# Patient Record
Sex: FEMALE | Race: WHITE | Employment: PART TIME | URBAN - METROPOLITAN AREA
[De-identification: names, ages, dates, MRNs, and addresses within clinical notes are randomized per-mention and may not be internally consistent; named-entity substitution may affect disease eponyms.]

---

## 2022-11-01 LAB — MAMMOGRAPHY, EXTERNAL: NORMAL

## 2023-04-20 ENCOUNTER — OFFICE VISIT (OUTPATIENT)
Dept: INTERNAL MEDICINE CLINIC | Age: 48
End: 2023-04-20
Payer: COMMERCIAL

## 2023-04-20 VITALS
OXYGEN SATURATION: 98 % | WEIGHT: 174 LBS | SYSTOLIC BLOOD PRESSURE: 130 MMHG | TEMPERATURE: 98 F | HEART RATE: 82 BPM | DIASTOLIC BLOOD PRESSURE: 88 MMHG

## 2023-04-20 DIAGNOSIS — R23.2 HOT FLASHES: ICD-10-CM

## 2023-04-20 DIAGNOSIS — Q21.0 VSD (VENTRICULAR SEPTAL DEFECT): ICD-10-CM

## 2023-04-20 DIAGNOSIS — R10.32 LLQ ABDOMINAL PAIN: ICD-10-CM

## 2023-04-20 PROCEDURE — 99203 OFFICE O/P NEW LOW 30 MIN: CPT | Performed by: STUDENT IN AN ORGANIZED HEALTH CARE EDUCATION/TRAINING PROGRAM

## 2023-04-20 RX ORDER — PROGESTERONE 200 MG/1
200 CAPSULE ORAL DAILY
COMMUNITY

## 2023-04-20 RX ORDER — TESTOSTERONE ENANTHATE 200 MG/ML
50 INJECTION, SOLUTION INTRAMUSCULAR ONCE
COMMUNITY

## 2023-04-20 SDOH — ECONOMIC STABILITY: FOOD INSECURITY: WITHIN THE PAST 12 MONTHS, YOU WORRIED THAT YOUR FOOD WOULD RUN OUT BEFORE YOU GOT MONEY TO BUY MORE.: NEVER TRUE

## 2023-04-20 SDOH — ECONOMIC STABILITY: FOOD INSECURITY: WITHIN THE PAST 12 MONTHS, THE FOOD YOU BOUGHT JUST DIDN'T LAST AND YOU DIDN'T HAVE MONEY TO GET MORE.: NEVER TRUE

## 2023-04-20 SDOH — ECONOMIC STABILITY: INCOME INSECURITY: HOW HARD IS IT FOR YOU TO PAY FOR THE VERY BASICS LIKE FOOD, HOUSING, MEDICAL CARE, AND HEATING?: NOT HARD AT ALL

## 2023-04-20 SDOH — ECONOMIC STABILITY: HOUSING INSECURITY
IN THE LAST 12 MONTHS, WAS THERE A TIME WHEN YOU DID NOT HAVE A STEADY PLACE TO SLEEP OR SLEPT IN A SHELTER (INCLUDING NOW)?: NO

## 2023-04-20 ASSESSMENT — ENCOUNTER SYMPTOMS
TROUBLE SWALLOWING: 0
CHEST TIGHTNESS: 0
VOICE CHANGE: 0
ABDOMINAL DISTENTION: 0
PHOTOPHOBIA: 0
STRIDOR: 0
CONSTIPATION: 0
SHORTNESS OF BREATH: 0
WHEEZING: 0
DIARRHEA: 0
COUGH: 0
ABDOMINAL PAIN: 0
NAUSEA: 0
CHOKING: 0
APNEA: 0
VOMITING: 0

## 2023-04-20 ASSESSMENT — PATIENT HEALTH QUESTIONNAIRE - PHQ9
SUM OF ALL RESPONSES TO PHQ9 QUESTIONS 1 & 2: 0
SUM OF ALL RESPONSES TO PHQ QUESTIONS 1-9: 0
1. LITTLE INTEREST OR PLEASURE IN DOING THINGS: 0
SUM OF ALL RESPONSES TO PHQ QUESTIONS 1-9: 0
2. FEELING DOWN, DEPRESSED OR HOPELESS: 0

## 2023-04-20 NOTE — PROGRESS NOTES
effort is normal. No respiratory distress. Breath sounds: Normal breath sounds. No wheezing or rales. Chest:      Chest wall: No tenderness. Abdominal:      General: Bowel sounds are normal. There is no distension. Palpations: Abdomen is soft. Musculoskeletal:         General: No tenderness or deformity. Normal range of motion. Cervical back: Normal range of motion and neck supple. Skin:     General: Skin is warm and dry. Neurological:      Mental Status: She is alert and oriented to person, place, and time. Cranial Nerves: No cranial nerve deficit. Sensory: No sensory deficit. Psychiatric:         Behavior: Behavior normal.            Please note that this chart was generated using dragon dictation software. Although every effort was made to ensure the accuracy of this automated transcription, some errors in transcription may have occurred.        --Monica Lorenzo MD

## 2023-05-03 ENCOUNTER — OFFICE VISIT (OUTPATIENT)
Dept: INTERNAL MEDICINE CLINIC | Age: 48
End: 2023-05-03
Payer: COMMERCIAL

## 2023-05-03 VITALS
DIASTOLIC BLOOD PRESSURE: 90 MMHG | BODY MASS INDEX: 30.56 KG/M2 | WEIGHT: 179 LBS | TEMPERATURE: 98.8 F | HEIGHT: 64 IN | SYSTOLIC BLOOD PRESSURE: 144 MMHG

## 2023-05-03 DIAGNOSIS — I10 PRIMARY HYPERTENSION: Primary | ICD-10-CM

## 2023-05-03 PROCEDURE — 3080F DIAST BP >= 90 MM HG: CPT | Performed by: STUDENT IN AN ORGANIZED HEALTH CARE EDUCATION/TRAINING PROGRAM

## 2023-05-03 PROCEDURE — 3077F SYST BP >= 140 MM HG: CPT | Performed by: STUDENT IN AN ORGANIZED HEALTH CARE EDUCATION/TRAINING PROGRAM

## 2023-05-03 PROCEDURE — 99213 OFFICE O/P EST LOW 20 MIN: CPT | Performed by: STUDENT IN AN ORGANIZED HEALTH CARE EDUCATION/TRAINING PROGRAM

## 2023-05-03 RX ORDER — VALSARTAN AND HYDROCHLOROTHIAZIDE 80; 12.5 MG/1; MG/1
1 TABLET, FILM COATED ORAL DAILY
Qty: 30 TABLET | Refills: 3 | Status: SHIPPED | OUTPATIENT
Start: 2023-05-03 | End: 2023-06-02

## 2023-05-03 RX ORDER — VALSARTAN AND HYDROCHLOROTHIAZIDE 80; 12.5 MG/1; MG/1
1 TABLET, FILM COATED ORAL DAILY
Qty: 30 TABLET | Refills: 3 | Status: SHIPPED | OUTPATIENT
Start: 2023-05-03 | End: 2023-05-03

## 2023-05-03 ASSESSMENT — ENCOUNTER SYMPTOMS
SHORTNESS OF BREATH: 0
VOMITING: 0
VOICE CHANGE: 0
STRIDOR: 0
DIARRHEA: 0
CONSTIPATION: 0
PHOTOPHOBIA: 0
ABDOMINAL DISTENTION: 0
WHEEZING: 0
ABDOMINAL PAIN: 0
CHOKING: 0
CHEST TIGHTNESS: 0
COUGH: 0
NAUSEA: 0
TROUBLE SWALLOWING: 0
APNEA: 0

## 2023-05-03 NOTE — PROGRESS NOTES
Chiquita Ding (:  1975) is a 46 y.o. female,Established patient, here for evaluation of the following chief complaint(s):  Hypertension (B/p  issues dizzy spells 168 /109 yesterday )         ASSESSMENT/PLAN:  1. Primary hypertension  Assessment & Plan:  Was previously treated with losartan and metoprolol. Did not tolerate lisinopril secondary to headaches. Reports some possible mood swing while on losartan. BP at home elevated 160s-170s/100s-110s  -Start Diovan HCTZ 80 - 12.5 mg daily  -We will have patient continue to monitor her blood pressure at home  -Check BMP and mag in a week      Orders:  -     Basic Metabolic Panel; Future  -     Magnesium; Future      Return in about 4 weeks (around 2023) for Hypertension. Subjective   SUBJECTIVE/OBJECTIVE:  HPI  The patient presented today for hypertension. She reports that for the past week, she endorses intermittent dizzy spells. During these episode, her blood pressure elevated to 160s to 170s over 100 to 110s. She has a history of hypertension, was on 2 medications at 1 point. She see cardiologist at [facility] and was previously on losartan and metoprolol. Slowly weaned off from them after obtaining good blood pressure control. Did not tolerate lisinopril secondary to headaches. She report that she had \"mood swings \"while on losartan. She was not able to have an appointment to see her cardiologist until September. Denies any chest pain, shortness of breath, headache, dizziness today. Review of Systems   Constitutional:  Negative for activity change, appetite change, chills, diaphoresis, fatigue, fever and unexpected weight change. HENT:  Negative for trouble swallowing and voice change. Eyes:  Negative for photophobia and visual disturbance. Respiratory:  Negative for apnea, cough, choking, chest tightness, shortness of breath, wheezing and stridor. Cardiovascular:  Negative for chest pain, palpitations and leg swelling.

## 2023-05-03 NOTE — ASSESSMENT & PLAN NOTE
Was previously treated with losartan and metoprolol. Did not tolerate lisinopril secondary to headaches. Reports some possible mood swing while on losartan.   BP at home elevated 160s-170s/100s-110s  -Start Diovan HCTZ 80 - 12.5 mg daily  -We will have patient continue to monitor her blood pressure at home  -Check BMP and mag in a week

## 2023-05-11 DIAGNOSIS — I10 PRIMARY HYPERTENSION: ICD-10-CM

## 2023-05-11 LAB
ANION GAP SERPL CALCULATED.3IONS-SCNC: 12 MMOL/L (ref 3–16)
BUN SERPL-MCNC: 16 MG/DL (ref 7–20)
CALCIUM SERPL-MCNC: 9.6 MG/DL (ref 8.3–10.6)
CHLORIDE SERPL-SCNC: 99 MMOL/L (ref 99–110)
CO2 SERPL-SCNC: 27 MMOL/L (ref 21–32)
CREAT SERPL-MCNC: 0.7 MG/DL (ref 0.6–1.1)
GFR SERPLBLD CREATININE-BSD FMLA CKD-EPI: >60 ML/MIN/{1.73_M2}
GLUCOSE SERPL-MCNC: 105 MG/DL (ref 70–99)
MAGNESIUM SERPL-MCNC: 2.1 MG/DL (ref 1.8–2.4)
POTASSIUM SERPL-SCNC: 4.4 MMOL/L (ref 3.5–5.1)
SODIUM SERPL-SCNC: 138 MMOL/L (ref 136–145)

## 2023-05-20 ENCOUNTER — TELEPHONE (OUTPATIENT)
Dept: INTERNAL MEDICINE CLINIC | Age: 48
End: 2023-05-20

## 2023-05-22 RX ORDER — VALSARTAN AND HYDROCHLOROTHIAZIDE 160; 12.5 MG/1; MG/1
1 TABLET, FILM COATED ORAL DAILY
Qty: 30 TABLET | Refills: 5 | Status: SHIPPED | OUTPATIENT
Start: 2023-05-22

## 2023-05-22 NOTE — TELEPHONE ENCOUNTER
BP seems to be improved but not at our goal yet. Will increase Valsartan to 160 and keep HCTZ at 12.5 in the new combo pills. Rx sent.

## 2023-05-31 ENCOUNTER — OFFICE VISIT (OUTPATIENT)
Dept: INTERNAL MEDICINE CLINIC | Age: 48
End: 2023-05-31
Payer: COMMERCIAL

## 2023-05-31 VITALS
DIASTOLIC BLOOD PRESSURE: 64 MMHG | WEIGHT: 177 LBS | BODY MASS INDEX: 30.22 KG/M2 | HEIGHT: 64 IN | TEMPERATURE: 98.8 F | SYSTOLIC BLOOD PRESSURE: 120 MMHG

## 2023-05-31 DIAGNOSIS — N20.0 KIDNEY STONE: ICD-10-CM

## 2023-05-31 DIAGNOSIS — Z01.818 PREOP EXAMINATION: Primary | ICD-10-CM

## 2023-05-31 PROCEDURE — 3074F SYST BP LT 130 MM HG: CPT | Performed by: STUDENT IN AN ORGANIZED HEALTH CARE EDUCATION/TRAINING PROGRAM

## 2023-05-31 PROCEDURE — 3078F DIAST BP <80 MM HG: CPT | Performed by: STUDENT IN AN ORGANIZED HEALTH CARE EDUCATION/TRAINING PROGRAM

## 2023-05-31 PROCEDURE — 99213 OFFICE O/P EST LOW 20 MIN: CPT | Performed by: STUDENT IN AN ORGANIZED HEALTH CARE EDUCATION/TRAINING PROGRAM

## 2023-05-31 ASSESSMENT — ENCOUNTER SYMPTOMS
TROUBLE SWALLOWING: 0
CHOKING: 0
SHORTNESS OF BREATH: 0
APNEA: 0
STRIDOR: 0
CONSTIPATION: 0
CHEST TIGHTNESS: 0
ABDOMINAL DISTENTION: 0
WHEEZING: 0
VOICE CHANGE: 0
VOMITING: 0
ABDOMINAL PAIN: 0
NAUSEA: 0
DIARRHEA: 0
COUGH: 0
PHOTOPHOBIA: 0

## 2023-05-31 NOTE — ASSESSMENT & PLAN NOTE
Large kidney stone 13 mm at the renal pelvis associated with moderate left hydronephrosis evident on CT abdomen and pelvis on 5/15/2023  Patient was evaluated by urology, plan for cystoscopy, stone removal and left stent placement on 6/1/2023. No urinary symptoms.   Patient agreed with procedure

## 2023-05-31 NOTE — PROGRESS NOTES
Pre-Op Examination    :  Radha Fried                                               : 1975  Age: 46 y.o. MRN: [id number]  Date : 2023    Referring Physician: Dr. Kareen Farley     Procedure: Cystoscopy, L ureteroscopy Sarabjit Peters laser stone manipulation, L stent insertion on 2023    HISTORY OF PRESENT ILLNESS:   The patient is a 46 y.o. female who presents for preoperative examination. She denies any urinary symptoms, however endorses nausea. Had CT A/P on 5/15/2023 that showed moderate left hydronephrosis and 13 mm stone in the renal pelvis. Was evaluated by Uro and plans for cysto, stone removal, and L stent placement. At baseline, METS more than 4. Of note, she is on hormonal replacement therapy including progesterone and testosterone. Planned anesthesia:  General  Known anesthesia problems: None   Bleeding risk:  Low   Personal or FH of DVT/PE:  None       Past Medical History:    Hypertension  Postmenopausal syndrome  VSD    Past Surgical History:    History reviewed. No pertinent surgical history. Family History:       Problem Relation Age of Onset    Hypertension Mother     Diabetes Father     Heart Attack Father         Pasted away from MI at 61       Social History:   TOBACCO:   reports that she has never smoked. She has never been exposed to tobacco smoke. She has never used smokeless tobacco.  ETOH:   reports no history of alcohol use. OCCUPATION:      Allergies:  Patient has no known allergies. Current Medications:    Prior to Admission medications    Medication Sig Start Date End Date Taking? Authorizing Provider   valsartan-hydroCHLOROthiazide (DIOVAN-HCT) 160-12.5 MG per tablet Take 1 tablet by mouth daily 23  Yes Luis Blue MD   progesterone (PROMETRIUM) 200 MG CAPS capsule Take 1 capsule by mouth daily   Yes Historical Provider, MD   testosterone enanthate (DELATESTRYL) 200 MG/ML injection Inject 0.25 mLs into the muscle once.    Yes Historical Provider, MD

## 2023-05-31 NOTE — ASSESSMENT & PLAN NOTE
No contraindications, patient can proceed with planned procedure with acceptable risk.   Also had cardiac clearance from her cardiologist including recent EKG

## 2023-06-30 PROBLEM — Z01.818 PREOP EXAMINATION: Status: RESOLVED | Noted: 2023-05-31 | Resolved: 2023-06-30

## 2023-10-20 ENCOUNTER — OFFICE VISIT (OUTPATIENT)
Dept: INTERNAL MEDICINE CLINIC | Age: 48
End: 2023-10-20
Payer: COMMERCIAL

## 2023-10-20 VITALS
HEIGHT: 64 IN | SYSTOLIC BLOOD PRESSURE: 134 MMHG | BODY MASS INDEX: 30.39 KG/M2 | DIASTOLIC BLOOD PRESSURE: 70 MMHG | WEIGHT: 178 LBS

## 2023-10-20 DIAGNOSIS — I10 PRIMARY HYPERTENSION: Primary | ICD-10-CM

## 2023-10-20 DIAGNOSIS — E78.2 MIXED HYPERLIPIDEMIA: ICD-10-CM

## 2023-10-20 PROCEDURE — 3078F DIAST BP <80 MM HG: CPT | Performed by: STUDENT IN AN ORGANIZED HEALTH CARE EDUCATION/TRAINING PROGRAM

## 2023-10-20 PROCEDURE — 3075F SYST BP GE 130 - 139MM HG: CPT | Performed by: STUDENT IN AN ORGANIZED HEALTH CARE EDUCATION/TRAINING PROGRAM

## 2023-10-20 PROCEDURE — 99213 OFFICE O/P EST LOW 20 MIN: CPT | Performed by: STUDENT IN AN ORGANIZED HEALTH CARE EDUCATION/TRAINING PROGRAM

## 2023-10-20 NOTE — PROGRESS NOTES
Rashmi Fierro (:  1975) is a 46 y.o. female here for evaluation of the following chief complaint(s):  Hypertension         ASSESSMENT/PLAN:  1. Primary hypertension  Assessment & Plan:   Well-controlled. She also see cardiology every 6 months for the VSD. Continue with current medications. Orders:  -     Hepatic Function Panel; Future  -     Lipid, Fasting; Future  2. Mixed hyperlipidemia  -     Lipid, Fasting; Future      Return in about 1 year (around 10/20/2024) for Annual Physical Exam.     We also discussed about vaccines today. I recommended flu vaccine and COVID-19 booster. Patient would like to hold off. She is going to get shingles vaccines at her local pharmacy. Subjective   SUBJECTIVE/OBJECTIVE:  LIZZY Benson presented today for follow-up visit. She has been doing well. No acute complaints today. She is compliant with her medications. She has concern about a finding on her CAT scan in May 2023 which was done to evaluate her kidney stone. There was a finding of some subcentimeter but most likely benign lesion in the liver. CAT scan reviewed with patient. Reassurance provided. we will check labs  No regular use of Tylenol, alcohol use, or history of hepatitis. .  Of note she is on progesterone and testosterone for hot flashes. She is also going to get a repeat echocardiogram to evaluate for VSD with cardiology. No chest pain, palpitation, shortness of breath.     Review of Systems:   Constitutional:  Denies fever or chills   Eyes:  Denies change in visual acuity   HENT:  Denies nasal congestion or sore throat   Respiratory:  Denies cough or shortness of breath   Cardiovascular:  Denies chest pain or edema   GI:  Denies abdominal pain, nausea, vomiting, bloody stools or diarrhea   :  Denies dysuria   Musculoskeletal:  Denies back pain or joint pain   Integument:  Denies rash   Neurologic:  Denies headache, focal weakness or sensory changes   Endocrine:  Denies polyuria or polydipsia

## 2023-10-20 NOTE — ASSESSMENT & PLAN NOTE
Well-controlled. She also see cardiology every 6 months for the VSD. Continue with current medications.

## 2023-11-10 DIAGNOSIS — E78.2 MIXED HYPERLIPIDEMIA: ICD-10-CM

## 2023-11-10 DIAGNOSIS — I10 PRIMARY HYPERTENSION: ICD-10-CM

## 2023-11-10 LAB
ALBUMIN SERPL-MCNC: 4.7 G/DL (ref 3.4–5)
ALP SERPL-CCNC: 46 U/L (ref 40–129)
ALT SERPL-CCNC: 15 U/L (ref 10–40)
AST SERPL-CCNC: 24 U/L (ref 15–37)
BILIRUB DIRECT SERPL-MCNC: <0.2 MG/DL (ref 0–0.3)
BILIRUB INDIRECT SERPL-MCNC: NORMAL MG/DL (ref 0–1)
BILIRUB SERPL-MCNC: 0.4 MG/DL (ref 0–1)
CHOLEST SERPL-MCNC: 302 MG/DL (ref 0–199)
HDLC SERPL-MCNC: 67 MG/DL (ref 40–60)
LDL CHOLESTEROL CALCULATED: 208 MG/DL
PROT SERPL-MCNC: 7.1 G/DL (ref 6.4–8.2)
TRIGL SERPL-MCNC: 134 MG/DL (ref 0–150)
VLDLC SERPL CALC-MCNC: 27 MG/DL

## 2023-11-14 RX ORDER — ATORVASTATIN CALCIUM 20 MG/1
20 TABLET, FILM COATED ORAL DAILY
Qty: 90 TABLET | Refills: 1 | Status: SHIPPED | OUTPATIENT
Start: 2023-11-14

## 2024-05-14 ENCOUNTER — TELEPHONE (OUTPATIENT)
Dept: INTERNAL MEDICINE CLINIC | Age: 49
End: 2024-05-14

## 2024-05-14 RX ORDER — ATORVASTATIN CALCIUM 20 MG/1
20 TABLET, FILM COATED ORAL DAILY
Qty: 90 TABLET | Refills: 1 | Status: SHIPPED | OUTPATIENT
Start: 2024-05-14

## 2024-05-14 NOTE — TELEPHONE ENCOUNTER
Pt states she needs the following medication refilled to the following pharmacy. Please call with any further questions or concerns at [phone].      atorvastatin (LIPITOR) 20 MG tablet       [facility] - [place], [place] - [address] - P [phone] - F [phone]

## 2024-05-17 ENCOUNTER — OFFICE VISIT (OUTPATIENT)
Dept: INTERNAL MEDICINE CLINIC | Age: 49
End: 2024-05-17

## 2024-05-17 VITALS
OXYGEN SATURATION: 98 % | SYSTOLIC BLOOD PRESSURE: 138 MMHG | HEART RATE: 76 BPM | DIASTOLIC BLOOD PRESSURE: 92 MMHG | TEMPERATURE: 97.5 F | HEIGHT: 64 IN | WEIGHT: 175 LBS | BODY MASS INDEX: 29.88 KG/M2

## 2024-05-17 DIAGNOSIS — Z00.00 ENCOUNTER FOR WELL ADULT EXAM WITHOUT ABNORMAL FINDINGS: ICD-10-CM

## 2024-05-17 DIAGNOSIS — Z83.3 FAMILY HISTORY OF DIABETES MELLITUS: ICD-10-CM

## 2024-05-17 DIAGNOSIS — R23.2 HOT FLASHES: ICD-10-CM

## 2024-05-17 DIAGNOSIS — N20.0 KIDNEY STONE: ICD-10-CM

## 2024-05-17 DIAGNOSIS — Q21.0 VSD (VENTRICULAR SEPTAL DEFECT): ICD-10-CM

## 2024-05-17 DIAGNOSIS — I10 PRIMARY HYPERTENSION: ICD-10-CM

## 2024-05-17 DIAGNOSIS — H93.8X1 CLOGGED EAR, RIGHT: ICD-10-CM

## 2024-05-17 DIAGNOSIS — Z23 NEED FOR SHINGLES VACCINE: ICD-10-CM

## 2024-05-17 DIAGNOSIS — Z00.00 ENCOUNTER FOR WELL ADULT EXAM WITHOUT ABNORMAL FINDINGS: Primary | ICD-10-CM

## 2024-05-17 LAB
ALBUMIN SERPL-MCNC: 5 G/DL (ref 3.4–5)
ALBUMIN/GLOB SERPL: 1.8 {RATIO} (ref 1.1–2.2)
ALP SERPL-CCNC: 52 U/L (ref 40–129)
ALT SERPL-CCNC: 17 U/L (ref 10–40)
ANION GAP SERPL CALCULATED.3IONS-SCNC: 13 MMOL/L (ref 3–16)
AST SERPL-CCNC: 23 U/L (ref 15–37)
BASOPHILS # BLD: 0 K/UL (ref 0–0.2)
BASOPHILS NFR BLD: 0.3 %
BILIRUB SERPL-MCNC: 0.3 MG/DL (ref 0–1)
BUN SERPL-MCNC: 15 MG/DL (ref 7–20)
CALCIUM SERPL-MCNC: 9.8 MG/DL (ref 8.3–10.6)
CHLORIDE SERPL-SCNC: 101 MMOL/L (ref 99–110)
CHOLEST SERPL-MCNC: 209 MG/DL (ref 0–199)
CO2 SERPL-SCNC: 27 MMOL/L (ref 21–32)
CREAT SERPL-MCNC: 0.9 MG/DL (ref 0.6–1.1)
DEPRECATED RDW RBC AUTO: 13.8 % (ref 12.4–15.4)
EOSINOPHIL # BLD: 0 K/UL (ref 0–0.6)
EOSINOPHIL NFR BLD: 0.1 %
GFR SERPLBLD CREATININE-BSD FMLA CKD-EPI: 77 ML/MIN/{1.73_M2}
GLUCOSE SERPL-MCNC: 112 MG/DL (ref 70–99)
HCT VFR BLD AUTO: 44 % (ref 36–48)
HDLC SERPL-MCNC: 85 MG/DL (ref 40–60)
HGB BLD-MCNC: 14.9 G/DL (ref 12–16)
LDL CHOLESTEROL: 106 MG/DL
LYMPHOCYTES # BLD: 1.1 K/UL (ref 1–5.1)
LYMPHOCYTES NFR BLD: 12.8 %
MCH RBC QN AUTO: 31.8 PG (ref 26–34)
MCHC RBC AUTO-ENTMCNC: 33.9 G/DL (ref 31–36)
MCV RBC AUTO: 93.8 FL (ref 80–100)
MONOCYTES # BLD: 0.5 K/UL (ref 0–1.3)
MONOCYTES NFR BLD: 5.8 %
NEUTROPHILS # BLD: 7.2 K/UL (ref 1.7–7.7)
NEUTROPHILS NFR BLD: 81 %
PLATELET # BLD AUTO: 254 K/UL (ref 135–450)
PMV BLD AUTO: 9.4 FL (ref 5–10.5)
POTASSIUM SERPL-SCNC: 4.3 MMOL/L (ref 3.5–5.1)
PROT SERPL-MCNC: 7.8 G/DL (ref 6.4–8.2)
RBC # BLD AUTO: 4.69 M/UL (ref 4–5.2)
SODIUM SERPL-SCNC: 141 MMOL/L (ref 136–145)
TRIGL SERPL-MCNC: 89 MG/DL (ref 0–150)
TSH SERPL DL<=0.005 MIU/L-ACNC: 0.97 UIU/ML (ref 0.27–4.2)
VLDLC SERPL CALC-MCNC: 18 MG/DL
WBC # BLD AUTO: 8.9 K/UL (ref 4–11)

## 2024-05-17 RX ORDER — VENLAFAXINE HYDROCHLORIDE 37.5 MG/1
37.5 CAPSULE, EXTENDED RELEASE ORAL DAILY
Qty: 30 CAPSULE | Refills: 1 | Status: SHIPPED | OUTPATIENT
Start: 2024-05-17

## 2024-05-17 RX ORDER — ESTRADIOL 0.03 MG/D
1 FILM, EXTENDED RELEASE TRANSDERMAL
COMMUNITY
Start: 2024-04-08

## 2024-05-17 RX ORDER — DICLOFENAC SODIUM 75 MG/1
75 TABLET, DELAYED RELEASE ORAL 2 TIMES DAILY
COMMUNITY
Start: 2024-05-15

## 2024-05-17 RX ORDER — METHYLPREDNISOLONE 4 MG
TABLET, DOSE PACK ORAL
COMMUNITY
Start: 2024-05-15 | End: 2024-05-24

## 2024-05-17 RX ORDER — ESTRADIOL 0.5 MG/.5G
0.5 GEL TOPICAL WEEKLY
Qty: 30 EACH | Refills: 1 | Status: SHIPPED | OUTPATIENT
Start: 2024-05-17 | End: 2024-06-16

## 2024-05-17 RX ORDER — TESTOSTERONE CYPIONATE 200 MG/ML
50 INJECTION, SOLUTION INTRAMUSCULAR
Qty: 2 ML | Refills: 1 | Status: SHIPPED | OUTPATIENT
Start: 2024-05-20 | End: 2024-06-19

## 2024-05-17 SDOH — ECONOMIC STABILITY: FOOD INSECURITY: WITHIN THE PAST 12 MONTHS, YOU WORRIED THAT YOUR FOOD WOULD RUN OUT BEFORE YOU GOT MONEY TO BUY MORE.: NEVER TRUE

## 2024-05-17 SDOH — ECONOMIC STABILITY: INCOME INSECURITY: HOW HARD IS IT FOR YOU TO PAY FOR THE VERY BASICS LIKE FOOD, HOUSING, MEDICAL CARE, AND HEATING?: NOT HARD AT ALL

## 2024-05-17 SDOH — ECONOMIC STABILITY: FOOD INSECURITY: WITHIN THE PAST 12 MONTHS, THE FOOD YOU BOUGHT JUST DIDN'T LAST AND YOU DIDN'T HAVE MONEY TO GET MORE.: NEVER TRUE

## 2024-05-17 ASSESSMENT — ENCOUNTER SYMPTOMS
STRIDOR: 0
CONSTIPATION: 0
ABDOMINAL DISTENTION: 0
VOMITING: 0
NAUSEA: 0
CHEST TIGHTNESS: 0
COUGH: 0
DIARRHEA: 0
APNEA: 0
WHEEZING: 0
TROUBLE SWALLOWING: 0
VOICE CHANGE: 0
SHORTNESS OF BREATH: 0
ABDOMINAL PAIN: 0
CHOKING: 0

## 2024-05-17 ASSESSMENT — PATIENT HEALTH QUESTIONNAIRE - PHQ9
2. FEELING DOWN, DEPRESSED OR HOPELESS: NOT AT ALL
SUM OF ALL RESPONSES TO PHQ QUESTIONS 1-9: 0
SUM OF ALL RESPONSES TO PHQ9 QUESTIONS 1 & 2: 0
2. FEELING DOWN, DEPRESSED OR HOPELESS: NOT AT ALL
1. LITTLE INTEREST OR PLEASURE IN DOING THINGS: NOT AT ALL
SUM OF ALL RESPONSES TO PHQ QUESTIONS 1-9: 0
1. LITTLE INTEREST OR PLEASURE IN DOING THINGS: NOT AT ALL
SUM OF ALL RESPONSES TO PHQ9 QUESTIONS 1 & 2: 0

## 2024-05-17 NOTE — ASSESSMENT & PLAN NOTE
This is a chronic and recurrent problem  She is seeing urology needed stents placement at 1 point.  Currently have some small stones.  She has been working on lifestyle modification but continues to have the stone.  She is on testosterone replacement therapy for hot flashes, which can increase risk of kidney stone.  See hot flashes above.

## 2024-05-17 NOTE — ASSESSMENT & PLAN NOTE
Within normal limits for age- mmunizations discussed, first Shingrix today, no depression  Colonoscopy up to date, last done in 2023, normal repeat in 10 years.  Will request a copy of her records  Eye exam up to date  Dental exam up to date   Derm exam up to date, yearly   Exercises as tolerated    Findings and recommendations discussed with Pt  Pending labs

## 2024-05-17 NOTE — ASSESSMENT & PLAN NOTE
Continue to have hot flashes despite being on HRT  - Continue with HRT as per GYN at this time. However, they can contribute to her other problems including HTN and recurrent kidney stones in additional to potential complications from long term HRT.   - Will try Effexor to see if that improves her sx. Start with 37.5 mg daily, reassess in 6 weeks. If needed will titrate up then  - May consider Veozah as alternative to HRT

## 2024-05-17 NOTE — PROGRESS NOTES
Recommendations for Preventive Services Due: see orders and patient instructions/AVS.    Return in about 6 weeks (around 6/28/2024) for Routine follow-up (Effexor) .

## 2024-05-17 NOTE — ASSESSMENT & PLAN NOTE
Well controlled. She follows up with Cards every 6 months   Continue with valsartan HCTZ 160-12.5 mg daily

## 2024-05-17 NOTE — ASSESSMENT & PLAN NOTE
She is motivated to work on weight. Has tried lifestyle modifications including healthy diets and exercise for months to years without much improvement. She want to work on losing about 10-15% of her weight (20 pounds). Currently 175 pounds. Her goal is 150 pounds.   We discussed medications options. Check labs. Will consider Zepbound. Potential side affect and goal of treatment discussed with patient.

## 2024-05-17 NOTE — ASSESSMENT & PLAN NOTE
Has been going on for almost a month. Exam normal today. But still sx. Could be due to dysfunctional eustachian tube as sx improves with sudafed. Will have her see ENT due to sx.   Avoid long term use of Sudafed as it can increase BP.

## 2024-05-18 LAB
EST. AVERAGE GLUCOSE BLD GHB EST-MCNC: 108.3 MG/DL
HBA1C MFR BLD: 5.4 %

## 2024-05-21 ENCOUNTER — TELEPHONE (OUTPATIENT)
Dept: INTERNAL MEDICINE CLINIC | Age: 49
End: 2024-05-21

## 2024-05-21 RX ORDER — ESTRADIOL 0.5 MG/1
0.5 TABLET ORAL DAILY
Qty: 21 TABLET | Refills: 1 | Status: SHIPPED | OUTPATIENT
Start: 2024-05-21

## 2024-05-21 NOTE — TELEPHONE ENCOUNTER
Pt states her estrogen patch is very pricey and would like the one she usually takes called in estradiol .05     Pharm- [id number] - [place], [place] - [address] [place] [phone] - F [phone]

## 2024-06-04 ENCOUNTER — OFFICE VISIT (OUTPATIENT)
Dept: ENT CLINIC | Age: 49
End: 2024-06-04
Payer: COMMERCIAL

## 2024-06-04 VITALS
WEIGHT: 179.2 LBS | DIASTOLIC BLOOD PRESSURE: 86 MMHG | HEIGHT: 64 IN | BODY MASS INDEX: 30.59 KG/M2 | TEMPERATURE: 97.7 F | SYSTOLIC BLOOD PRESSURE: 132 MMHG | HEART RATE: 72 BPM

## 2024-06-04 DIAGNOSIS — H69.91 DYSFUNCTION OF RIGHT EUSTACHIAN TUBE: ICD-10-CM

## 2024-06-04 DIAGNOSIS — H90.11 CONDUCTIVE HEARING LOSS OF RIGHT EAR WITH UNRESTRICTED HEARING OF LEFT EAR: ICD-10-CM

## 2024-06-04 DIAGNOSIS — H92.01 EAR DISCOMFORT, RIGHT: Primary | ICD-10-CM

## 2024-06-04 PROCEDURE — 92504 EAR MICROSCOPY EXAMINATION: CPT | Performed by: OTOLARYNGOLOGY

## 2024-06-04 PROCEDURE — 99203 OFFICE O/P NEW LOW 30 MIN: CPT | Performed by: OTOLARYNGOLOGY

## 2024-06-04 PROCEDURE — 3079F DIAST BP 80-89 MM HG: CPT | Performed by: OTOLARYNGOLOGY

## 2024-06-04 PROCEDURE — 3075F SYST BP GE 130 - 139MM HG: CPT | Performed by: OTOLARYNGOLOGY

## 2024-06-04 RX ORDER — MOMETASONE FUROATE 50 UG/1
2 SPRAY, METERED NASAL 2 TIMES DAILY
Qty: 2 EACH | Refills: 0 | Status: SHIPPED | OUTPATIENT
Start: 2024-06-04

## 2024-06-04 ASSESSMENT — ENCOUNTER SYMPTOMS
FACIAL SWELLING: 0
VOMITING: 0
BACK PAIN: 0
SHORTNESS OF BREATH: 0
SINUS PAIN: 0
PHOTOPHOBIA: 0
BLOOD IN STOOL: 0
DIARRHEA: 0
RHINORRHEA: 0
CONSTIPATION: 0
WHEEZING: 0
COLOR CHANGE: 0
COUGH: 0
SORE THROAT: 0
TROUBLE SWALLOWING: 0
SINUS PRESSURE: 0
CHOKING: 0
NAUSEA: 0
EYE DISCHARGE: 0
EYE ITCHING: 0
VOICE CHANGE: 0
STRIDOR: 0

## 2024-06-04 NOTE — PROGRESS NOTES
membrane is not perforated, retracted or bulging. Tympanic membrane has normal mobility.      Left Ear: Hearing, tympanic membrane and external ear normal. No decreased hearing noted. No drainage, swelling or tenderness.  No middle ear effusion. No mastoid tenderness. Tympanic membrane is not perforated, retracted or bulging. Tympanic membrane has normal mobility.      Ears:      More exam findings: Lateralizes right.     Nose: No nasal deformity, septal deviation, laceration, mucosal edema or rhinorrhea.      Right Sinus: No maxillary sinus tenderness or frontal sinus tenderness.      Left Sinus: No maxillary sinus tenderness or frontal sinus tenderness.      Mouth/Throat:      Mouth: Mucous membranes are not pale, not dry and not cyanotic. No lacerations or oral lesions.      Dentition: Normal dentition. No dental caries or dental abscesses.      Pharynx: Uvula midline. No oropharyngeal exudate, posterior oropharyngeal erythema or uvula swelling.      Tonsils: No tonsillar abscesses.   Eyes:      General: Lids are normal.         Right eye: No discharge.         Left eye: No discharge.      Extraocular Movements:      Right eye: Normal extraocular motion and no nystagmus.      Left eye: Normal extraocular motion and no nystagmus.      Conjunctiva/sclera:      Right eye: No chemosis or exudate.     Left eye: No chemosis or exudate.  Neck:      Thyroid: No thyroid mass or thyromegaly.      Vascular: Normal carotid pulses.      Trachea: No tracheal tenderness or tracheal deviation.   Cardiovascular:      Rate and Rhythm: Normal rate and regular rhythm.   Pulmonary:      Effort: No tachypnea, bradypnea or respiratory distress.      Breath sounds: No stridor.   Musculoskeletal:      Right shoulder: Normal range of motion.      Cervical back: Neck supple.   Lymphadenopathy:      Head:      Right side of head: No submental, submandibular, tonsillar, preauricular, posterior auricular or occipital adenopathy.      Left

## 2024-06-16 PROBLEM — Z00.00 ENCOUNTER FOR WELL ADULT EXAM WITHOUT ABNORMAL FINDINGS: Status: RESOLVED | Noted: 2024-05-17 | Resolved: 2024-06-16

## 2024-06-19 DIAGNOSIS — R23.2 HOT FLASHES: ICD-10-CM

## 2024-06-19 RX ORDER — TESTOSTERONE CYPIONATE 200 MG/ML
50 INJECTION, SOLUTION INTRAMUSCULAR
Qty: 2 ML | Refills: 3 | Status: SHIPPED | OUTPATIENT
Start: 2024-06-20 | End: 2024-06-26

## 2024-06-19 RX ORDER — ESTRADIOL 0.05 MG/D
1 PATCH TRANSDERMAL
Qty: 4 PATCH | Refills: 3 | Status: SHIPPED | OUTPATIENT
Start: 2024-06-20 | End: 2024-06-20

## 2024-06-20 RX ORDER — ESTRADIOL 0.05 MG/D
1 PATCH, EXTENDED RELEASE TRANSDERMAL
Qty: 4 PATCH | Refills: 1 | Status: SHIPPED | OUTPATIENT
Start: 2024-06-20

## 2024-06-26 DIAGNOSIS — R23.2 HOT FLASHES: ICD-10-CM

## 2024-06-26 RX ORDER — TESTOSTERONE CYPIONATE 200 MG/ML
10 INJECTION, SOLUTION INTRAMUSCULAR
Qty: 1.2 ML | Refills: 3 | Status: SHIPPED | OUTPATIENT
Start: 2024-06-27 | End: 2024-09-25

## 2024-07-02 ENCOUNTER — OFFICE VISIT (OUTPATIENT)
Dept: ENT CLINIC | Age: 49
End: 2024-07-02
Payer: COMMERCIAL

## 2024-07-02 ENCOUNTER — PROCEDURE VISIT (OUTPATIENT)
Dept: AUDIOLOGY | Age: 49
End: 2024-07-02
Payer: COMMERCIAL

## 2024-07-02 VITALS — TEMPERATURE: 98.7 F | HEART RATE: 78 BPM | DIASTOLIC BLOOD PRESSURE: 86 MMHG | SYSTOLIC BLOOD PRESSURE: 135 MMHG

## 2024-07-02 DIAGNOSIS — H93.8X1 SENSATION OF FULLNESS IN RIGHT EAR: ICD-10-CM

## 2024-07-02 DIAGNOSIS — H92.01 EAR DISCOMFORT, RIGHT: Primary | ICD-10-CM

## 2024-07-02 DIAGNOSIS — H90.3 SENSORINEURAL HEARING LOSS, BILATERAL: Primary | ICD-10-CM

## 2024-07-02 DIAGNOSIS — H90.11 CONDUCTIVE HEARING LOSS OF RIGHT EAR WITH UNRESTRICTED HEARING OF LEFT EAR: ICD-10-CM

## 2024-07-02 DIAGNOSIS — H69.91 DYSFUNCTION OF RIGHT EUSTACHIAN TUBE: ICD-10-CM

## 2024-07-02 PROCEDURE — 92567 TYMPANOMETRY: CPT

## 2024-07-02 PROCEDURE — 92557 COMPREHENSIVE HEARING TEST: CPT

## 2024-07-02 PROCEDURE — 3079F DIAST BP 80-89 MM HG: CPT | Performed by: OTOLARYNGOLOGY

## 2024-07-02 PROCEDURE — 3075F SYST BP GE 130 - 139MM HG: CPT | Performed by: OTOLARYNGOLOGY

## 2024-07-02 PROCEDURE — 99213 OFFICE O/P EST LOW 20 MIN: CPT | Performed by: OTOLARYNGOLOGY

## 2024-07-02 ASSESSMENT — ENCOUNTER SYMPTOMS
FACIAL SWELLING: 0
COLOR CHANGE: 0
CHOKING: 0
PHOTOPHOBIA: 0
STRIDOR: 0
SINUS PAIN: 0
COUGH: 0
DIARRHEA: 0
SINUS PRESSURE: 0
TROUBLE SWALLOWING: 0
EYE PAIN: 0
RHINORRHEA: 0
VOICE CHANGE: 0
EYE ITCHING: 0
SORE THROAT: 0
NAUSEA: 0
EYE REDNESS: 0
SHORTNESS OF BREATH: 0

## 2024-07-02 NOTE — PROGRESS NOTES
Normal sloping to mild sensorineural hearing loss   Speech Recognition Threshold: 10 dB HL  Word Recognition: Excellent 100%, based on NU-6 25-word list at 50 dBHL using recorded speech stimuli.    Tympanometry: Positive peak pressure with hyper compliance, normal ear canal volume, consistent with middle ear abnormality.       Reliability: Good   Transducer: HF Headphones    See scanned audiogram dated 7/2/2024 for results.        PATIENT EDUCATION:       The following items were discussed with the patient:   - Good Communication Strategies  - Hearing Loss and Hearing Aids    Educational information was shared in the After Visit Summary.                                                RECOMMENDATIONS:                                                                                                                                                                                                                                                            The following items are recommended based on patient report and results from today's appointment:   - Continue medical follow-up with Boston Choi DO.   - Retest hearing as medically indicated and/or sooner if a change in hearing is noted.  - Utilize \"Good Communication Strategies\" as discussed to assist in speech understanding with communication partners.    Christianne Mckinney  Audiologist    Chart CC'd to: Boston Choi DO      Degree of   Hearing Sensitivity dB Range   Within Normal Limits (WNL) 0 - 20   Mild 20 - 40   Moderate 40 - 55   Moderately-Severe 55 - 70   Severe 70 - 90   Profound 90 +

## 2024-07-02 NOTE — PROGRESS NOTES
sensorineural hearing loss.  Type a tympanograms with a positive peak on the left.  Bilateral tympanograms are hypermobile.  100% word recognition scores.  Upon further questioning, she does endorse autophany.  Suspect she may be experiencing patulous eustachian tube symptoms in the right ear, even though the left ear appears worse.  Recommended trial of a PE tube in the right ear.  She will schedule a time for procedure visit to have this performed in the office.    2. Dysfunction of right eustachian tube      3. Conductive hearing loss of right ear with unrestricted hearing of left ear        Return for pe tube right.      [ ] Review/order radiology tests   [ ] Independent interpretation of diagnostic test by another provider  [ ] Discussed case with another provider  [ ] High risk of loss of major body function  [ ] Elective major surgery with risk factors    Portions of this note were dictated using Dragon. There may be linguistic errors secondary to the use of this program.

## 2024-07-09 ENCOUNTER — PROCEDURE VISIT (OUTPATIENT)
Dept: ENT CLINIC | Age: 49
End: 2024-07-09
Payer: COMMERCIAL

## 2024-07-09 VITALS
TEMPERATURE: 97.5 F | WEIGHT: 168 LBS | HEART RATE: 62 BPM | HEIGHT: 64 IN | BODY MASS INDEX: 28.68 KG/M2 | RESPIRATION RATE: 16 BRPM | DIASTOLIC BLOOD PRESSURE: 82 MMHG | SYSTOLIC BLOOD PRESSURE: 131 MMHG

## 2024-07-09 DIAGNOSIS — H69.01 PATULOUS EUSTACHIAN TUBE OF RIGHT EAR: Primary | ICD-10-CM

## 2024-07-09 PROCEDURE — 69433 CREATE EARDRUM OPENING: CPT | Performed by: OTOLARYNGOLOGY

## 2024-07-09 NOTE — PROGRESS NOTES
PE Tube    Pre op Dx: right-sided patulous eustachian tube  Post Op: Same  Procedure: right-sided Myringotomy with tympanostomy tube placement  Consent: written obtained  Surgeon: Boston Choi DO  Description:  With the use of an operating microscopy and a 4mm speculum, the external auditory canals were examined bilaterally. Any cerumen was removed using instrumentation. The tympanic membranes were visualize bilaterally. Topical phenol was applied to the anterior-inferior quadrant of right-sided tympanic membrane(s). A myringotomy knife was used to make a radial incision in the tympanic membrane(s). A 3-suction was used to suction out fluid from the middle ear space. An alligator forceps was used to place a Ochoa collar button PE tube in the myringotomy. The patient tolerated the procedure well. There were no complications with the procedure.

## 2024-07-15 RX ORDER — ESTRADIOL 0.05 MG/D
PATCH, EXTENDED RELEASE TRANSDERMAL
Qty: 8 PATCH | OUTPATIENT
Start: 2024-07-15

## 2024-07-15 RX ORDER — ESTRADIOL 0.05 MG/D
1 PATCH, EXTENDED RELEASE TRANSDERMAL
Qty: 4 PATCH | Refills: 3 | Status: SHIPPED | OUTPATIENT
Start: 2024-07-15

## 2024-07-30 ENCOUNTER — OFFICE VISIT (OUTPATIENT)
Dept: ENT CLINIC | Age: 49
End: 2024-07-30
Payer: COMMERCIAL

## 2024-07-30 VITALS
DIASTOLIC BLOOD PRESSURE: 75 MMHG | HEART RATE: 71 BPM | SYSTOLIC BLOOD PRESSURE: 124 MMHG | BODY MASS INDEX: 27.98 KG/M2 | WEIGHT: 163 LBS

## 2024-07-30 DIAGNOSIS — Z96.22 PATENT TYMPANOSTOMY TUBE: ICD-10-CM

## 2024-07-30 DIAGNOSIS — H69.01 PATULOUS EUSTACHIAN TUBE OF RIGHT EAR: Primary | ICD-10-CM

## 2024-07-30 DIAGNOSIS — H93.8X1 SENSATION OF FULLNESS IN RIGHT EAR: ICD-10-CM

## 2024-07-30 PROCEDURE — 3078F DIAST BP <80 MM HG: CPT | Performed by: OTOLARYNGOLOGY

## 2024-07-30 PROCEDURE — 99213 OFFICE O/P EST LOW 20 MIN: CPT | Performed by: OTOLARYNGOLOGY

## 2024-07-30 PROCEDURE — 3074F SYST BP LT 130 MM HG: CPT | Performed by: OTOLARYNGOLOGY

## 2024-07-30 PROCEDURE — 92504 EAR MICROSCOPY EXAMINATION: CPT | Performed by: OTOLARYNGOLOGY

## 2024-07-30 ASSESSMENT — ENCOUNTER SYMPTOMS
TROUBLE SWALLOWING: 0
EYE REDNESS: 0
STRIDOR: 0
EYE PAIN: 0
DIARRHEA: 0
SINUS PAIN: 0
COLOR CHANGE: 0
VOICE CHANGE: 0
CHOKING: 0
EYE ITCHING: 0
FACIAL SWELLING: 0
SHORTNESS OF BREATH: 0
PHOTOPHOBIA: 0
NAUSEA: 0
SINUS PRESSURE: 0
COUGH: 0
RHINORRHEA: 0
SORE THROAT: 0

## 2024-07-30 NOTE — PROGRESS NOTES
[facility]  [address], [address]  [place] [address]  P: [phone]  F: [phone]       Patient     Radha King  1975    ChiefComplaint     Chief Complaint   Patient presents with    Follow-up     F/U  ear tubes       History of Present Illness     Radha King is a pleasant 49 y.o. female here for 3-week follow-up for right tube placement for suspected patulous eustachian tube.  She states there is no significant symptom improvement, may be a small amount.  She did notice some slight change in hearing that has improved.  She did state that flying is easier now.  Sudafed does help relieve the symptoms.  Denies prior history of migraine.    Past Medical History     Past Medical History:   Diagnosis Date    Arthritis     Cancer (HCC)     Hypertension     Tinnitus        Past Surgical History     Past Surgical History:   Procedure Laterality Date    WISDOM TOOTH EXTRACTION         Family History     Family History   Problem Relation Age of Onset    Hypertension Mother     Diabetes Father     Heart Attack Father         Pasted away from MI at 59       Social History     Social History     Socioeconomic History    Marital status:      Spouse name: Not on file    Number of children: Not on file    Years of education: Not on file    Highest education level: Not on file   Occupational History    Not on file   Tobacco Use    Smoking status: Never     Passive exposure: Never    Smokeless tobacco: Never   Substance and Sexual Activity    Alcohol use: Never    Drug use: Never    Sexual activity: Not on file   Other Topics Concern    Not on file   Social History Narrative    Not on file     Social Determinants of Health     Financial Resource Strain: Low Risk  (5/17/2024)    Overall Financial Resource Strain (CARDIA)     Difficulty of Paying Living Expenses: Not hard at all   Food Insecurity: No Food Insecurity (5/17/2024)    Hunger Vital Sign     Worried About Running Out of Food

## 2024-08-28 RX ORDER — ESTRADIOL 0.05 MG/D
1 PATCH, EXTENDED RELEASE TRANSDERMAL
Qty: 4 PATCH | Refills: 3 | Status: SHIPPED | OUTPATIENT
Start: 2024-08-29

## 2024-10-02 RX ORDER — ESTRADIOL 0.05 MG/D
PATCH, EXTENDED RELEASE TRANSDERMAL
Qty: 8 PATCH | Refills: 0 | Status: SHIPPED | OUTPATIENT
Start: 2024-10-02

## 2024-10-02 NOTE — TELEPHONE ENCOUNTER
Last appointment: 5/17/2024  Next appointment: Visit date not found  Last refill: 8/29/24  Requested Prescriptions     Pending Prescriptions Disp Refills    estradiol (VIVELLE) 0.05 MG/24HR [Pharmacy Med Name: ESTRADIOL 0.05MG PATCH (TWICE WK)] 8 patch      Sig: APPLY TO SKIN TWICE WEEKLY

## 2024-10-25 RX ORDER — ATORVASTATIN CALCIUM 20 MG/1
20 TABLET, FILM COATED ORAL DAILY
Qty: 90 TABLET | Refills: 1 | Status: SHIPPED | OUTPATIENT
Start: 2024-10-25

## 2024-11-04 RX ORDER — ESTRADIOL 0.05 MG/D
PATCH, EXTENDED RELEASE TRANSDERMAL
Qty: 8 PATCH | Refills: 2 | Status: SHIPPED | OUTPATIENT
Start: 2024-11-04

## 2024-11-19 ENCOUNTER — OFFICE VISIT (OUTPATIENT)
Dept: ENT CLINIC | Age: 49
End: 2024-11-19
Payer: COMMERCIAL

## 2024-11-19 VITALS
RESPIRATION RATE: 16 BRPM | WEIGHT: 148 LBS | DIASTOLIC BLOOD PRESSURE: 82 MMHG | BODY MASS INDEX: 25.27 KG/M2 | HEART RATE: 70 BPM | HEIGHT: 64 IN | SYSTOLIC BLOOD PRESSURE: 129 MMHG | TEMPERATURE: 97.3 F

## 2024-11-19 DIAGNOSIS — H69.01 PATULOUS EUSTACHIAN TUBE OF RIGHT EAR: Primary | ICD-10-CM

## 2024-11-19 DIAGNOSIS — H93.8X1 SENSATION OF FULLNESS IN RIGHT EAR: ICD-10-CM

## 2024-11-19 DIAGNOSIS — Z96.22 PATENT TYMPANOSTOMY TUBE: ICD-10-CM

## 2024-11-19 PROCEDURE — 3074F SYST BP LT 130 MM HG: CPT | Performed by: OTOLARYNGOLOGY

## 2024-11-19 PROCEDURE — 3079F DIAST BP 80-89 MM HG: CPT | Performed by: OTOLARYNGOLOGY

## 2024-11-19 PROCEDURE — 99213 OFFICE O/P EST LOW 20 MIN: CPT | Performed by: OTOLARYNGOLOGY

## 2024-11-19 ASSESSMENT — ENCOUNTER SYMPTOMS
CHOKING: 0
SHORTNESS OF BREATH: 0
VOICE CHANGE: 0
FACIAL SWELLING: 0
COLOR CHANGE: 0
DIARRHEA: 0
SORE THROAT: 0
SINUS PAIN: 0
EYE REDNESS: 0
RHINORRHEA: 0
COUGH: 0
EYE ITCHING: 0
PHOTOPHOBIA: 0
TROUBLE SWALLOWING: 0
STRIDOR: 0
SINUS PRESSURE: 0
EYE PAIN: 0
NAUSEA: 0

## 2024-11-19 NOTE — PROGRESS NOTES
normal.           Procedure           Assessment and Plan     1. Patulous eustachian tube of right ear  The patient endorses persistent ear fullness symptoms on the right.  The PE tube has not relieved the symptoms, however it has helped some of the patulous eustachian tube symptoms.  She complains of persistent fullness that is relieved by Sudafed only.  She is able to pass air through the eustachian tubes so I am not concerned about swelling within the eustachian tube specifically.  I am wondering if there may be some underlying atypical migraine cause any symptoms.  She has a history of kidney stones and cannot take magnesium.  I recommend a trial of supplementation with B2 and coenzyme every 10.  Otherwise follow-up in 6 months.    2. Sensation of fullness in right ear      3. Patent tympanostomy tube        Return in about 6 months (around 5/19/2025).      [ ] Review/order radiology tests   [ ] Independent interpretation of diagnostic test by another provider  [ ] Discussed case with another provider  [ ] High risk of loss of major body function  [ ] Elective major surgery with risk factors    Portions of this note were dictated using Dragon. There may be linguistic errors secondary to the use of this program.

## 2025-01-06 RX ORDER — ESTRADIOL 0.05 MG/D
PATCH, EXTENDED RELEASE TRANSDERMAL
Qty: 8 PATCH | Refills: 0 | Status: SHIPPED | OUTPATIENT
Start: 2025-01-06

## 2025-01-06 NOTE — TELEPHONE ENCOUNTER
Last appointment: 5/17/2024  Next appointment: Visit date not found  Pending 30 days no refill with note pt must call to sched appointment prior to additional refills.       Last refill:   Requested Prescriptions     Pending Prescriptions Disp Refills    estradiol (VIVELLE) 0.05 MG/24HR [Pharmacy Med Name: ESTRADIOL 0.05MG PATCH (TWICE WK)] 8 patch 2     Sig: APPLY TO SKIN TWICE WEEKLY

## 2025-02-17 RX ORDER — ATORVASTATIN CALCIUM 20 MG/1
20 TABLET, FILM COATED ORAL DAILY
Qty: 90 TABLET | Refills: 1 | Status: SHIPPED | OUTPATIENT
Start: 2025-02-17

## 2025-02-17 NOTE — TELEPHONE ENCOUNTER
Last appointment: 5/17/2024  Next appointment: Visit date not found        Last refill: 10/25/24

## 2025-03-24 DIAGNOSIS — R23.2 HOT FLASHES: ICD-10-CM

## 2025-03-24 RX ORDER — ESTRADIOL 0.05 MG/D
1 PATCH, EXTENDED RELEASE TRANSDERMAL
Qty: 8 PATCH | Refills: 0 | Status: SHIPPED | OUTPATIENT
Start: 2025-03-24

## 2025-03-24 RX ORDER — TESTOSTERONE CYPIONATE 200 MG/ML
10 INJECTION, SOLUTION INTRAMUSCULAR
Qty: 0.4 ML | Refills: 2 | Status: SHIPPED | OUTPATIENT
Start: 2025-03-24 | End: 2025-06-22

## 2025-03-25 ENCOUNTER — TELEPHONE (OUTPATIENT)
Dept: INTERNAL MEDICINE CLINIC | Age: 50
End: 2025-03-25

## 2025-03-25 NOTE — TELEPHONE ENCOUNTER
Guicho from  [employer] needs clarification on     testosterone cypionate (DEPOTESTOTERONE CYPIONATE) 200 MG/ML injection [[id number]]     [phone] Guicho [facility]

## 2025-03-25 NOTE — TELEPHONE ENCOUNTER
Pharmacist is checking to see if Dr Groves is aware and ok with her using testosterone SUB Q, not IM.

## 2025-04-22 RX ORDER — ESTRADIOL 0.05 MG/D
PATCH, EXTENDED RELEASE TRANSDERMAL
Qty: 8 PATCH | Refills: 0 | Status: SHIPPED | OUTPATIENT
Start: 2025-04-22

## 2025-04-22 NOTE — TELEPHONE ENCOUNTER
Last appointment: 5/17/2024  Next appointment: 5/30/2025      Last refill: (3/24/2025) by Ana Groves MD

## 2025-05-20 RX ORDER — ESTRADIOL 0.05 MG/D
1 PATCH, EXTENDED RELEASE TRANSDERMAL
Qty: 8 PATCH | Refills: 0 | Status: SHIPPED | OUTPATIENT
Start: 2025-05-22

## 2025-05-29 ASSESSMENT — PATIENT HEALTH QUESTIONNAIRE - PHQ9
SUM OF ALL RESPONSES TO PHQ QUESTIONS 1-9: 0
1. LITTLE INTEREST OR PLEASURE IN DOING THINGS: NOT AT ALL
SUM OF ALL RESPONSES TO PHQ9 QUESTIONS 1 & 2: 0
SUM OF ALL RESPONSES TO PHQ QUESTIONS 1-9: 0
2. FEELING DOWN, DEPRESSED OR HOPELESS: NOT AT ALL
1. LITTLE INTEREST OR PLEASURE IN DOING THINGS: NOT AT ALL
2. FEELING DOWN, DEPRESSED OR HOPELESS: NOT AT ALL
SUM OF ALL RESPONSES TO PHQ QUESTIONS 1-9: 0
SUM OF ALL RESPONSES TO PHQ QUESTIONS 1-9: 0

## 2025-05-30 ENCOUNTER — OFFICE VISIT (OUTPATIENT)
Dept: INTERNAL MEDICINE CLINIC | Age: 50
End: 2025-05-30
Payer: COMMERCIAL

## 2025-05-30 VITALS
WEIGHT: 135.6 LBS | HEIGHT: 64 IN | OXYGEN SATURATION: 95 % | HEART RATE: 73 BPM | DIASTOLIC BLOOD PRESSURE: 76 MMHG | TEMPERATURE: 97.7 F | BODY MASS INDEX: 23.15 KG/M2 | SYSTOLIC BLOOD PRESSURE: 128 MMHG

## 2025-05-30 DIAGNOSIS — R63.4 WEIGHT LOSS: ICD-10-CM

## 2025-05-30 DIAGNOSIS — Z00.00 ENCOUNTER FOR WELL ADULT EXAM WITHOUT ABNORMAL FINDINGS: Primary | ICD-10-CM

## 2025-05-30 DIAGNOSIS — Z23 NEED FOR TDAP VACCINATION: ICD-10-CM

## 2025-05-30 PROCEDURE — 99396 PREV VISIT EST AGE 40-64: CPT | Performed by: STUDENT IN AN ORGANIZED HEALTH CARE EDUCATION/TRAINING PROGRAM

## 2025-05-30 PROCEDURE — 90715 TDAP VACCINE 7 YRS/> IM: CPT | Performed by: STUDENT IN AN ORGANIZED HEALTH CARE EDUCATION/TRAINING PROGRAM

## 2025-05-30 PROCEDURE — 3074F SYST BP LT 130 MM HG: CPT | Performed by: STUDENT IN AN ORGANIZED HEALTH CARE EDUCATION/TRAINING PROGRAM

## 2025-05-30 PROCEDURE — 3078F DIAST BP <80 MM HG: CPT | Performed by: STUDENT IN AN ORGANIZED HEALTH CARE EDUCATION/TRAINING PROGRAM

## 2025-05-30 PROCEDURE — 90471 IMMUNIZATION ADMIN: CPT | Performed by: STUDENT IN AN ORGANIZED HEALTH CARE EDUCATION/TRAINING PROGRAM

## 2025-05-30 SDOH — ECONOMIC STABILITY: FOOD INSECURITY: WITHIN THE PAST 12 MONTHS, YOU WORRIED THAT YOUR FOOD WOULD RUN OUT BEFORE YOU GOT MONEY TO BUY MORE.: NEVER TRUE

## 2025-05-30 SDOH — ECONOMIC STABILITY: FOOD INSECURITY: WITHIN THE PAST 12 MONTHS, THE FOOD YOU BOUGHT JUST DIDN'T LAST AND YOU DIDN'T HAVE MONEY TO GET MORE.: NEVER TRUE

## 2025-05-30 ASSESSMENT — ENCOUNTER SYMPTOMS
ABDOMINAL PAIN: 0
ABDOMINAL DISTENTION: 0
PHOTOPHOBIA: 0
APNEA: 0
WHEEZING: 0
COUGH: 0
TROUBLE SWALLOWING: 0
CHOKING: 0
CONSTIPATION: 0
CHEST TIGHTNESS: 0
STRIDOR: 0
DIARRHEA: 0
NAUSEA: 0
SHORTNESS OF BREATH: 0
VOICE CHANGE: 0
VOMITING: 0

## 2025-05-30 NOTE — ASSESSMENT & PLAN NOTE
Within normal limits for age- mmunizations discussed, recommended shingrix. Tdap today, no depression  Colonoscopy up to date, last done in 2023, normal repeat in 10 years.    Mammogram up to date continue annually   On HRT  Eye exam due recommended routine eye exam  Dental exam up to date   Derm exam up to date, yearly   Exercises as tolerated    Findings and recommendations discussed with Pt  Labs discussed

## 2025-05-30 NOTE — PROGRESS NOTES
Well Adult Note  Name: Radha King Today’s Date: 2025   MRN: [id number] Sex: Female   Age: 50 y.o. Ethnicity: Non- / Non    : 1975 Race: White (non-)      Radha King is here for a well adult exam.       Assessment & Plan   Encounter for well adult exam without abnormal findings  Assessment & Plan:  Within normal limits for age- mmunizations discussed, recommended shingrix. Tdap today, no depression  Colonoscopy up to date, last done in , normal repeat in 10 years.    Mammogram up to date continue annually   On HRT  Eye exam due recommended routine eye exam  Dental exam up to date   Derm exam up to date, yearly   Exercises as tolerated    Findings and recommendations discussed with Pt  Labs discussed     Need for Tdap vaccination  -     Tdap, BOOSTRIX, (age 10 yrs+), IM  Weight loss  Assessment & Plan:  intentional   Lost about 30 pounds since last year with Tripeptide (compounded)  Currently on 12.5 mg   Recent labs are all reassuring   Continue to monitor         Return in about 6 months (around 2025) for Routine follow-up.       Subjective   History:  Radha is here for a follow up visit exam   She is doing well   Still on Tripeptide through only pharmacy. She is currently on 12.5 mg.   Lost about 30 pounds since her last visit.     Review of Systems   Constitutional:  Negative for activity change, appetite change, chills, diaphoresis, fatigue, fever and unexpected weight change.   HENT:  Negative for trouble swallowing and voice change.    Eyes:  Negative for photophobia and visual disturbance.   Respiratory:  Negative for apnea, cough, choking, chest tightness, shortness of breath, wheezing and stridor.    Cardiovascular:  Negative for chest pain, palpitations and leg swelling.   Gastrointestinal:  Negative for abdominal distention, abdominal pain, constipation, diarrhea, nausea and vomiting.   Genitourinary:  Negative for difficulty urinating and dysuria.   Skin:

## 2025-05-30 NOTE — ASSESSMENT & PLAN NOTE
intentional   Lost about 30 pounds since last year with Tripeptide (compounded)  Currently on 12.5 mg   Recent labs are all reassuring   Continue to monitor

## 2025-06-29 PROBLEM — Z00.00 ENCOUNTER FOR WELL ADULT EXAM WITHOUT ABNORMAL FINDINGS: Status: RESOLVED | Noted: 2024-05-17 | Resolved: 2025-06-29

## 2025-07-28 RX ORDER — ESTRADIOL 0.05 MG/D
PATCH, EXTENDED RELEASE TRANSDERMAL
Qty: 25 PATCH | Refills: 1 | Status: SHIPPED | OUTPATIENT
Start: 2025-07-28

## 2025-07-28 NOTE — TELEPHONE ENCOUNTER
Last appointment: 5/30/2025  Next appointment: Visit date not found        Last refill: 5/20/2025) by Ana Groves MD   Patient is not due to rto until   (around 11/30/2025) for Routine follow-up.